# Patient Record
Sex: FEMALE | Race: WHITE | HISPANIC OR LATINO | RURAL
[De-identification: names, ages, dates, MRNs, and addresses within clinical notes are randomized per-mention and may not be internally consistent; named-entity substitution may affect disease eponyms.]

---

## 2020-09-24 ENCOUNTER — APPOINTMENT (OUTPATIENT)
Age: 40
Setting detail: DERMATOLOGY
End: 2020-09-25

## 2020-09-24 VITALS — TEMPERATURE: 97.6 F

## 2020-09-24 DIAGNOSIS — Z41.9 ENCOUNTER FOR PROCEDURE FOR PURPOSES OTHER THAN REMEDYING HEALTH STATE, UNSPECIFIED: ICD-10-CM

## 2020-09-24 PROCEDURE — OTHER BOTOX: OTHER

## 2020-09-24 PROCEDURE — OTHER MIPS QUALITY: OTHER

## 2020-09-24 PROCEDURE — OTHER FILLERS: OTHER

## 2020-09-24 NOTE — PROCEDURE: MIPS QUALITY
Quality 111:Pneumonia Vaccination Status For Older Adults: Pneumococcal Vaccination Previously Received
Quality 110: Preventive Care And Screening: Influenza Immunization: Influenza Immunization not Administered because Patient Refused.
Detail Level: Detailed
Quality 402: Tobacco Use And Help With Quitting Among Adolescents: Patient screened for tobacco and never smoked
Quality 226: Preventive Care And Screening: Tobacco Use: Screening And Cessation Intervention: Patient screened for tobacco use and is an ex/non-smoker

## 2020-09-24 NOTE — PROCEDURE: BOTOX
Lateral Platysmal Bands Units: 0
Show Mentalis Units: No
Show Topical Anesthesia: Yes
Periorbital Skin Units: 3
Forehead Units: 16
Detail Level: Detailed
Expiration Date (Month Year): 12/2020
Glabellar Complex Units: 12
Price (Use Numbers Only, No Special Characters Or $): 421
Dilution (U/0.1 Cc): 2.6
Consent: Written consent obtained. Risks include but not limited to lid/brow ptosis, bruising, swelling, diplopia, temporary effect, incomplete chemical denervation.
Lot #: [id number]
Nasal Root Units: 4
Post-Care Instructions: Patient instructed to not lie down for 4 hours and limit physical activity for 24 hours. Patient instructed not to travel by airplane for 48 hours.

## 2020-09-24 NOTE — PROCEDURE: FILLERS
Additional Area 1 Volume In Cc: 0
Consent: Written consent obtained. Risks include but not limited to bruising, beading, irregular texture, ulceration, infection, allergic reaction, scar formation, incomplete augmentation, temporary nature, procedural pain.
Lot #: [id number]
Include Cannula Information In Note?: No
Post-Care Instructions: Patient instructed to apply ice to reduce swelling.
Map Statment: See Attach Map for Complete Details
Price (Use Numbers Only, No Special Characters Or $): 191
Expiration Date (Month Year): 1/13/2022
Nasolabial Folds Filler Volume In Cc: 0.4
Filler: Versa
Additional Anesthesia Volume In Cc: 6
Detail Level: Detailed

## 2020-10-01 ENCOUNTER — APPOINTMENT (OUTPATIENT)
Age: 40
Setting detail: DERMATOLOGY
End: 2020-10-02

## 2020-10-01 VITALS — TEMPERATURE: 97.9 F

## 2020-10-01 DIAGNOSIS — Z41.9 ENCOUNTER FOR PROCEDURE FOR PURPOSES OTHER THAN REMEDYING HEALTH STATE, UNSPECIFIED: ICD-10-CM

## 2020-10-01 PROCEDURE — OTHER KYBELLA INJECTION: OTHER

## 2020-10-01 NOTE — PROCEDURE: KYBELLA INJECTION
Price (Use Numbers Only, No Special Characters Or $): 1200
Packages Used (Won't Render If 0 - Required If Using Inventory): 2
Number Of Grid Dots (Won't Render If 0): 50
Post-Care Instructions: Patient instructed to apply ice to reduce swelling.
Treatment Area: Submental Chin
Reconstitution Date: 10/1/2020
Lot #: [id number]
Detail Level: Detailed
Procedure Text: The treatment areas were cleansed. Kybella was administered using the parameters mentioned above.
Consent: Written consent obtained. Risks include but not limited to bruising, beading, irregular texture, ulceration, infection, allergic reaction, scar formation, incomplete augmentation, temporary nature, procedural pain.
Treatment Number (Won't Render If 0): 1
Anesthesia Volume In Cc: 0.5
Expiration Date (Month Year): sept 2021

## 2020-11-12 ENCOUNTER — APPOINTMENT (OUTPATIENT)
Age: 40
Setting detail: DERMATOLOGY
End: 2020-11-13

## 2020-11-12 ENCOUNTER — APPOINTMENT (OUTPATIENT)
Age: 40
Setting detail: DERMATOLOGY
End: 2020-11-12

## 2020-11-12 VITALS — TEMPERATURE: 98.1 F

## 2020-11-12 DIAGNOSIS — Z41.9 ENCOUNTER FOR PROCEDURE FOR PURPOSES OTHER THAN REMEDYING HEALTH STATE, UNSPECIFIED: ICD-10-CM

## 2020-11-12 PROCEDURE — OTHER KYBELLA INJECTION: OTHER

## 2020-11-12 NOTE — PROCEDURE: KYBELLA INJECTION
Expiration Date (Month Year): sept 2021
Number Of Grid Dots (Won't Render If 0): 15
Detail Level: Detailed
Anesthesia Volume In Cc: 0.5
Treatment Number (Won't Render If 0): 1
Post-Care Instructions: Patient instructed to apply ice to reduce swelling.
Reconstitution Date: 10/1/2020
Consent: Written consent obtained. Risks include but not limited to bruising, beading, irregular texture, ulceration, infection, allergic reaction, scar formation, incomplete augmentation, temporary nature, procedural pain.
Price (Use Numbers Only, No Special Characters Or $): 800.0
Kybella Volume In Cc (Won't Render If 0): 2
Treatment Area: Submental Chin
Procedure Text: The treatment areas were cleansed. Kybella was administered using the parameters mentioned above.
Lot #: [id number]

## 2020-11-17 ENCOUNTER — APPOINTMENT (OUTPATIENT)
Age: 40
Setting detail: DERMATOLOGY
End: 2020-11-18

## 2020-11-17 VITALS — TEMPERATURE: 97.6 F

## 2020-11-17 DIAGNOSIS — Z41.9 ENCOUNTER FOR PROCEDURE FOR PURPOSES OTHER THAN REMEDYING HEALTH STATE, UNSPECIFIED: ICD-10-CM

## 2020-11-17 PROCEDURE — OTHER BOTOX: OTHER

## 2020-11-17 PROCEDURE — OTHER MIPS QUALITY: OTHER

## 2020-11-17 NOTE — PROCEDURE: BOTOX
Show Ucl Units: No
Depressor Anguli Oris Units: 0
Show Additional Area 1: Yes
Detail Level: Detailed
Periorbital Skin Units: 4
Consent: Written consent obtained. Risks include but not limited to lid/brow ptosis, bruising, swelling, diplopia, temporary effect, incomplete chemical denervation.
Reconstitution Date (Optional): 11/17/20
Expiration Date (Month Year): 08/2023
Lot #: [id number]
Dilution (U/0.1 Cc): 2.6
Post-Care Instructions: Patient instructed to not lie down for 4 hours and limit physical activity for 24 hours. Patient instructed not to travel by airplane for 48 hours.

## 2020-11-17 NOTE — PROCEDURE: MIPS QUALITY
Quality 110: Preventive Care And Screening: Influenza Immunization: Influenza Immunization not Administered because Patient Refused.
Quality 111:Pneumonia Vaccination Status For Older Adults: Pneumococcal Vaccination Previously Received
Detail Level: Detailed
Quality 226: Preventive Care And Screening: Tobacco Use: Screening And Cessation Intervention: Patient screened for tobacco use and is an ex/non-smoker
Quality 402: Tobacco Use And Help With Quitting Among Adolescents: Patient screened for tobacco and never smoked

## 2021-03-24 ENCOUNTER — APPOINTMENT (OUTPATIENT)
Age: 41
Setting detail: DERMATOLOGY
End: 2021-03-25

## 2021-03-24 VITALS — TEMPERATURE: 97.8 F

## 2021-03-24 DIAGNOSIS — Z41.9 ENCOUNTER FOR PROCEDURE FOR PURPOSES OTHER THAN REMEDYING HEALTH STATE, UNSPECIFIED: ICD-10-CM

## 2021-03-24 PROCEDURE — OTHER MIPS QUALITY: OTHER

## 2021-03-24 PROCEDURE — OTHER BOTOX: OTHER

## 2021-03-24 PROCEDURE — OTHER KYBELLA INJECTION: OTHER

## 2021-03-24 NOTE — PROCEDURE: MIPS QUALITY
Quality 110: Preventive Care And Screening: Influenza Immunization: Influenza Immunization not Administered because Patient Refused.
Quality 111:Pneumonia Vaccination Status For Older Adults: Pneumococcal Vaccination Previously Received
Quality 402: Tobacco Use And Help With Quitting Among Adolescents: Patient screened for tobacco and never smoked
Quality 226: Preventive Care And Screening: Tobacco Use: Screening And Cessation Intervention: Patient screened for tobacco use and is an ex/non-smoker
Detail Level: Detailed

## 2021-03-24 NOTE — PROCEDURE: BOTOX
Show Additional Area 2: Yes
Show Right And Left Periorbital Units: No
Inferior Lateral Orbicularis Oculi Units: 0
Expiration Date (Month Year): 08/2023
Post-Care Instructions: Patient instructed to not lie down for 4 hours and limit physical activity for 24 hours. Patient instructed not to travel by airplane for 48 hours.
Lot #: [id number]
Reconstitution Date (Optional): 11/17/20
Show Inventory Tab: Show
Dilution (U/0.1 Cc): 2.6
Forehead Units: 12
Detail Level: Detailed
Glabellar Complex Units: 8
Consent: Written consent obtained. Risks include but not limited to lid/brow ptosis, bruising, swelling, diplopia, temporary effect, incomplete chemical denervation.

## 2021-03-24 NOTE — PROCEDURE: KYBELLA INJECTION
Anesthesia Volume In Cc: 0.5
Detail Level: Detailed
Kybella Volume In Cc (Won't Render If 0): 2
Treatment Area: Submental Chin
Expiration Date (Month Year): sept 2021
Reconstitution Date: 10/1/2020
Price (Use Numbers Only, No Special Characters Or $): 1100
Post-Care Instructions: Patient instructed to apply ice to reduce swelling.
Consent: Written consent obtained. Risks include but not limited to bruising, beading, irregular texture, ulceration, infection, allergic reaction, scar formation, incomplete augmentation, temporary nature, procedural pain.
Procedure Text: The treatment areas were cleansed. Kybella was administered using the parameters mentioned above.
Lot #: [id number]
Treatment Number (Won't Render If 0): 3
Number Of Grid Dots (Won't Render If 0): 15

## 2021-07-06 ENCOUNTER — APPOINTMENT (OUTPATIENT)
Age: 41
Setting detail: DERMATOLOGY
End: 2021-07-07

## 2021-07-06 VITALS — TEMPERATURE: 98 F

## 2021-07-06 DIAGNOSIS — Z41.9 ENCOUNTER FOR PROCEDURE FOR PURPOSES OTHER THAN REMEDYING HEALTH STATE, UNSPECIFIED: ICD-10-CM

## 2021-07-06 PROCEDURE — OTHER BOTOX: OTHER

## 2021-07-06 PROCEDURE — OTHER MIPS QUALITY: OTHER

## 2021-07-06 PROCEDURE — OTHER KYBELLA INJECTION: OTHER

## 2021-07-06 NOTE — PROCEDURE: BOTOX
Show Inventory Tab: Show
Show Right And Left Brow Units: No
Additional Area 3 Units: 0
Lot #: [id number]
Show Glabellar Units: Yes
Post-Care Instructions: Patient instructed to not lie down for 4 hours and limit physical activity for 24 hours. Patient instructed not to travel by airplane for 48 hours.
Dilution (U/0.1 Cc): 2.6
Nasal Root Units: 2
Glabellar Complex Units: 10
Consent: Written consent obtained. Risks include but not limited to lid/brow ptosis, bruising, swelling, diplopia, temporary effect, incomplete chemical denervation.
Detail Level: Detailed
Price (Use Numbers Only, No Special Characters Or $): 220.00
Expiration Date (Month Year): 12/2020

## 2021-07-06 NOTE — PROCEDURE: MIPS QUALITY
Quality 226: Preventive Care And Screening: Tobacco Use: Screening And Cessation Intervention: Patient screened for tobacco use and is an ex/non-smoker
Quality 402: Tobacco Use And Help With Quitting Among Adolescents: Patient screened for tobacco and never smoked
Quality 111:Pneumonia Vaccination Status For Older Adults: Pneumococcal Vaccination Previously Received
Detail Level: Detailed
Quality 110: Preventive Care And Screening: Influenza Immunization: Influenza Immunization not Administered because Patient Refused.

## 2021-07-06 NOTE — PROCEDURE: KYBELLA INJECTION
Number Of Grid Dots (Won't Render If 0): 15
Post-Care Instructions: Patient instructed to apply ice to reduce swelling.
Treatment Area: Submental Chin
Kybella Volume In Cc (Won't Render If 0): 2.3
Lot #: [id number]
Expiration Date (Month Year): sept 2021
Packages Used (Won't Render If 0 - Required If Using Inventory): 1
Consent: Written consent obtained. Risks include but not limited to bruising, beading, irregular texture, ulceration, infection, allergic reaction, scar formation, incomplete augmentation, temporary nature, procedural pain.
Reconstitution Date: 10/1/2020
Detail Level: Detailed
Procedure Text: The treatment areas were cleansed. Kybella was administered using the parameters mentioned above.
Treatment Number (Won't Render If 0): 4
Price (Use Numbers Only, No Special Characters Or $): 327
Anesthesia Volume In Cc: 0.5

## 2021-07-19 ENCOUNTER — APPOINTMENT (OUTPATIENT)
Age: 41
Setting detail: DERMATOLOGY
End: 2021-07-20

## 2021-07-19 VITALS — TEMPERATURE: 97.8 F

## 2021-07-19 DIAGNOSIS — L23.9 ALLERGIC CONTACT DERMATITIS, UNSPECIFIED CAUSE: ICD-10-CM

## 2021-07-19 DIAGNOSIS — Z41.9 ENCOUNTER FOR PROCEDURE FOR PURPOSES OTHER THAN REMEDYING HEALTH STATE, UNSPECIFIED: ICD-10-CM

## 2021-07-19 PROCEDURE — OTHER TREATMENT REGIMEN: OTHER

## 2021-07-19 PROCEDURE — OTHER PRESCRIPTION: OTHER

## 2021-07-19 PROCEDURE — OTHER COUNSELING: OTHER

## 2021-07-19 PROCEDURE — OTHER MIPS QUALITY: OTHER

## 2021-07-19 PROCEDURE — OTHER BOTOX: OTHER

## 2021-07-19 RX ORDER — TRIAMCINOLONE ACETONIDE 1 MG/G
CREAM TOPICAL QD
Qty: 1 | Refills: 0 | Status: ERX | COMMUNITY
Start: 2021-07-19

## 2021-07-19 ASSESSMENT — LOCATION DETAILED DESCRIPTION DERM
LOCATION DETAILED: LEFT LATERAL EYEBROW
LOCATION DETAILED: LEFT MEDIAL CANTHUS
LOCATION DETAILED: RIGHT LATERAL EYEBROW

## 2021-07-19 ASSESSMENT — LOCATION SIMPLE DESCRIPTION DERM
LOCATION SIMPLE: RIGHT EYEBROW
LOCATION SIMPLE: LEFT EYEBROW
LOCATION SIMPLE: LEFT EYELID

## 2021-07-19 ASSESSMENT — LOCATION ZONE DERM
LOCATION ZONE: FACE
LOCATION ZONE: EYELID

## 2021-07-19 NOTE — PROCEDURE: MIPS QUALITY
Detail Level: Detailed
Quality 111:Pneumonia Vaccination Status For Older Adults: Pneumococcal Vaccination Previously Received
Quality 110: Preventive Care And Screening: Influenza Immunization: Influenza Immunization not Administered because Patient Refused.
Quality 226: Preventive Care And Screening: Tobacco Use: Screening And Cessation Intervention: Patient screened for tobacco use and is an ex/non-smoker
Quality 402: Tobacco Use And Help With Quitting Among Adolescents: Patient screened for tobacco and never smoked

## 2021-10-29 ENCOUNTER — APPOINTMENT (OUTPATIENT)
Age: 41
Setting detail: DERMATOLOGY
End: 2021-11-01

## 2021-10-29 VITALS — TEMPERATURE: 97.8 F

## 2021-10-29 DIAGNOSIS — Z41.9 ENCOUNTER FOR PROCEDURE FOR PURPOSES OTHER THAN REMEDYING HEALTH STATE, UNSPECIFIED: ICD-10-CM

## 2021-10-29 PROCEDURE — OTHER BOTOX: OTHER

## 2021-10-29 PROCEDURE — OTHER MIPS QUALITY: OTHER

## 2021-10-29 NOTE — PROCEDURE: BOTOX
Show Mentalis Units: No
Nasal Root Units: 4
Post-Care Instructions: Patient instructed to not lie down for 4 hours and limit physical activity for 24 hours. Patient instructed not to travel by airplane for 48 hours.
Forehead Units: 10
Left Periorbital Skin Units: 0
Show Inventory Tab: Show
Expiration Date (Month Year): 12/2020
Show Depressor Anguli Units: Yes
Dilution (U/0.1 Cc): 2.6
Periorbital Skin Units: 8
Detail Level: Detailed
Price (Use Numbers Only, No Special Characters Or $): 220.00
Consent: Written consent obtained. Risks include but not limited to lid/brow ptosis, bruising, swelling, diplopia, temporary effect, incomplete chemical denervation.
Lot #: [id number]

## 2021-11-01 ENCOUNTER — APPOINTMENT (OUTPATIENT)
Age: 41
Setting detail: DERMATOLOGY
End: 2021-11-01

## 2021-11-01 ENCOUNTER — APPOINTMENT (OUTPATIENT)
Age: 41
Setting detail: DERMATOLOGY
End: 2021-11-02

## 2021-11-01 VITALS — TEMPERATURE: 97.8 F

## 2021-11-01 DIAGNOSIS — L98.8 OTHER SPECIFIED DISORDERS OF THE SKIN AND SUBCUTANEOUS TISSUE: ICD-10-CM

## 2021-11-01 PROCEDURE — OTHER MIPS QUALITY: OTHER

## 2021-11-01 PROCEDURE — OTHER VERSA PLUS INJECTION: OTHER

## 2021-11-01 PROCEDURE — OTHER OTHER: OTHER

## 2021-11-01 ASSESSMENT — LOCATION DETAILED DESCRIPTION DERM
LOCATION DETAILED: LEFT SUPERIOR MEDIAL BUCCAL CHEEK
LOCATION DETAILED: RIGHT SUPERIOR MEDIAL BUCCAL CHEEK

## 2021-11-01 ASSESSMENT — LOCATION SIMPLE DESCRIPTION DERM
LOCATION SIMPLE: RIGHT CHEEK
LOCATION SIMPLE: LEFT CHEEK

## 2021-11-01 ASSESSMENT — LOCATION ZONE DERM: LOCATION ZONE: FACE

## 2021-11-01 NOTE — PROCEDURE: VERSA PLUS INJECTION
Jawline Filler Volume In Cc: 0
Nasolabial Folds Filler Volume In Cc: 1
Include Cannula Information In Note?: No
Map Statment: See Attach Map for Complete Details
Show Inventory Tab: Show
Post-Care Instructions: Patient instructed to apply ice to reduce swelling.
Anesthesia Volume In Cc: 0.5
Anesthesia Type: 1% lidocaine with epinephrine
Consent: Written consent obtained. Risks include but not limited to bruising, beading, irregular texture, ulceration, infection, allergic reaction, scar formation, incomplete augmentation, temporary nature, procedural pain.
Procedural Text: The filler was administered to the treatment areas noted above.
Additional Anesthesia Volume In Cc: 6
Detail Level: Detailed
Filler: Versa

## 2021-11-01 NOTE — PROCEDURE: OTHER
Other (Free Text): 6 corrective units added
Detail Level: Zone
Render Risk Assessment In Note?: no
Note Text (......Xxx Chief Complaint.): This diagnosis correlates with the

## 2021-11-01 NOTE — PROCEDURE: MIPS QUALITY
Quality 226: Preventive Care And Screening: Tobacco Use: Screening And Cessation Intervention: Patient screened for tobacco use and is an ex/non-smoker
Quality 402: Tobacco Use And Help With Quitting Among Adolescents: Patient screened for tobacco and never smoked
Detail Level: Detailed
Quality 110: Preventive Care And Screening: Influenza Immunization: Influenza Immunization not Administered because Patient Refused.
Quality 111:Pneumonia Vaccination Status For Older Adults: Pneumococcal Vaccination Previously Received

## 2022-05-18 NOTE — PROCEDURE: BOTOX
Lcl Root Units: 0
Show Mentalis Units: No
Show Periorbital Units: Yes
Detail Level: Detailed
Price (Use Numbers Only, No Special Characters Or $): 220.00
Periorbital Skin Units: 2
Expiration Date (Month Year): 12/2020
Consent: Written consent obtained. Risks include but not limited to lid/brow ptosis, bruising, swelling, diplopia, temporary effect, incomplete chemical denervation.
Lot #: [id number]
Dilution (U/0.1 Cc): 2.6
Show Inventory Tab: Show
Nasal Root Units: 4
Post-Care Instructions: Patient instructed to not lie down for 4 hours and limit physical activity for 24 hours. Patient instructed not to travel by airplane for 48 hours.
Clothing

## 2022-08-23 ENCOUNTER — APPOINTMENT (OUTPATIENT)
Age: 42
Setting detail: DERMATOLOGY
End: 2022-08-23

## 2022-08-23 ENCOUNTER — APPOINTMENT (OUTPATIENT)
Age: 42
Setting detail: DERMATOLOGY
End: 2022-08-24

## 2022-08-23 VITALS — TEMPERATURE: 98 F

## 2022-08-23 DIAGNOSIS — Z41.9 ENCOUNTER FOR PROCEDURE FOR PURPOSES OTHER THAN REMEDYING HEALTH STATE, UNSPECIFIED: ICD-10-CM

## 2022-08-23 PROCEDURE — OTHER BOTOX: OTHER

## 2022-08-23 PROCEDURE — OTHER JUVEDERM ULTRA PLUS XC INJECTION: OTHER

## 2022-08-23 PROCEDURE — OTHER JUVEDERM VOLBELLA INJECTION: OTHER

## 2022-08-23 PROCEDURE — OTHER MIPS QUALITY: OTHER

## 2022-08-23 ASSESSMENT — LOCATION DETAILED DESCRIPTION DERM
LOCATION DETAILED: RIGHT INFERIOR VERMILION LIP
LOCATION DETAILED: RIGHT SUPERIOR VERMILION LIP
LOCATION DETAILED: RIGHT INFERIOR MEDIAL MALAR CHEEK
LOCATION DETAILED: LEFT INFERIOR MEDIAL MALAR CHEEK
LOCATION DETAILED: LEFT INFERIOR VERMILION LIP

## 2022-08-23 ASSESSMENT — LOCATION SIMPLE DESCRIPTION DERM
LOCATION SIMPLE: RIGHT LIP
LOCATION SIMPLE: RIGHT CHEEK
LOCATION SIMPLE: LEFT CHEEK
LOCATION SIMPLE: LEFT LIP

## 2022-08-23 ASSESSMENT — LOCATION ZONE DERM
LOCATION ZONE: LIP
LOCATION ZONE: FACE

## 2022-08-23 NOTE — PROCEDURE: MIPS QUALITY
Quality 226: Preventive Care And Screening: Tobacco Use: Screening And Cessation Intervention: Patient screened for tobacco use and is an ex/non-smoker
Quality 110: Preventive Care And Screening: Influenza Immunization: Influenza Immunization not Administered because Patient Refused.
Quality 111:Pneumonia Vaccination Status For Older Adults: Pneumococcal Vaccination Previously Received
Detail Level: Detailed
Quality 402: Tobacco Use And Help With Quitting Among Adolescents: Patient screened for tobacco and never smoked

## 2022-08-23 NOTE — PROCEDURE: BOTOX
Show Ucl Units: No
Levator Labii Superioris Units: 0
Show Nasal Units: Yes
Dilution (U/0.1 Cc): 2.6
Post-Care Instructions: Patient instructed to not lie down for 4 hours and limit physical activity for 24 hours. Patient instructed not to travel by airplane for 48 hours.
Nasal Root Units: 6
Expiration Date (Month Year): 12/2020
Forehead Units: 10
Consent: Written consent obtained. Risks include but not limited to lid/brow ptosis, bruising, swelling, diplopia, temporary effect, incomplete chemical denervation.
Detail Level: Detailed
Price (Use Numbers Only, No Special Characters Or $): 220.00
Periorbital Skin Units: 8
Show Inventory Tab: Show
Lot #: [id number]

## 2022-08-23 NOTE — PROCEDURE: JUVEDERM ULTRA PLUS XC INJECTION
Vermilion Lips Filler Volume In Cc: 0
Filler: Juvederm Ultra Plus XC
Anesthesia Type: 1% lidocaine with epinephrine
Anesthesia Volume In Cc: 0.5
Map Statment: See Attach Map for Complete Details
Nasolabial Folds Filler Volume In Cc: 1
Use Map Statement For Sites (Optional): No
Additional Anesthesia Volume In Cc: 6
Procedural Text: The filler was administered to the treatment areas noted above.
Post-Care Instructions: Patient instructed to apply ice to reduce swelling.
Detail Level: Detailed
Consent: Written consent obtained. Risks include but not limited to bruising, beading, irregular texture, ulceration, infection, allergic reaction, scar formation, incomplete augmentation, temporary nature, procedural pain.
Show Inventory Tab: Show

## 2022-08-23 NOTE — PROCEDURE: JUVEDERM VOLBELLA INJECTION
Cheeks Filler Volume In Cc: 0
Include Cannula Information In Note?: No
Procedural Text: The filler was administered to the treatment areas noted above.
Number Of Syringes (Required For Inventory): 1
Additional Anesthesia Volume In Cc: 6
Filler: Juvederm Volbella XC
Anesthesia Type: 1% lidocaine with epinephrine
Anesthesia Volume In Cc: 0.5
Show Inventory Tab: Show
Detail Level: Detailed
Post-Care Instructions: Patient instructed to apply ice to reduce swelling.
Consent: Written consent obtained. Risks include but not limited to bruising, beading, irregular texture, ulceration, infection, allergic reaction, scar formation, incomplete augmentation, temporary nature, procedural pain.
Map Statment: See Attach Map for Complete Details

## 2022-09-13 ENCOUNTER — APPOINTMENT (OUTPATIENT)
Age: 42
Setting detail: DERMATOLOGY
End: 2022-09-14

## 2022-09-13 DIAGNOSIS — L98.8 OTHER SPECIFIED DISORDERS OF THE SKIN AND SUBCUTANEOUS TISSUE: ICD-10-CM

## 2022-09-13 PROCEDURE — OTHER MIPS QUALITY: OTHER

## 2022-09-13 PROCEDURE — OTHER BOTOX: OTHER

## 2022-09-13 ASSESSMENT — LOCATION DETAILED DESCRIPTION DERM
LOCATION DETAILED: RIGHT NASAL SIDEWALL
LOCATION DETAILED: RIGHT SUPERIOR LATERAL MALAR CHEEK
LOCATION DETAILED: RIGHT INFERIOR LATERAL FOREHEAD
LOCATION DETAILED: NASAL DORSUM
LOCATION DETAILED: GLABELLA
LOCATION DETAILED: LEFT SUPERIOR LATERAL MALAR CHEEK
LOCATION DETAILED: LEFT INFERIOR LATERAL FOREHEAD

## 2022-09-13 ASSESSMENT — LOCATION SIMPLE DESCRIPTION DERM
LOCATION SIMPLE: RIGHT FOREHEAD
LOCATION SIMPLE: LEFT FOREHEAD
LOCATION SIMPLE: LEFT CHEEK
LOCATION SIMPLE: RIGHT CHEEK
LOCATION SIMPLE: RIGHT NOSE
LOCATION SIMPLE: GLABELLA
LOCATION SIMPLE: NOSE

## 2022-09-13 ASSESSMENT — LOCATION ZONE DERM
LOCATION ZONE: NOSE
LOCATION ZONE: FACE

## 2022-09-13 NOTE — PROCEDURE: BOTOX
Show Anterior Platysmal Band Units: Yes
Show Lcl Units: No
Inferior Lateral Orbicularis Oculi Units: 0
Lot #: [id number]
Show Inventory Tab: Show
Post-Care Instructions: Patient instructed to not lie down for 4 hours and limit physical activity for 24 hours. Patient instructed not to travel by airplane for 48 hours.
Dilution (U/0.1 Cc): 2.6
Expiration Date (Month Year): 12/2020
Consent: Written consent obtained. Risks include but not limited to lid/brow ptosis, bruising, swelling, diplopia, temporary effect, incomplete chemical denervation.
Nasal Root Units: 4
Detail Level: Detailed
Price (Use Numbers Only, No Special Characters Or $): 220.00
Periorbital Skin Units: 10

## 2022-09-13 NOTE — PROCEDURE: MIPS QUALITY
Quality 226: Preventive Care And Screening: Tobacco Use: Screening And Cessation Intervention: Patient screened for tobacco use and is an ex/non-smoker
Quality 111:Pneumonia Vaccination Status For Older Adults: Pneumococcal Vaccination Previously Received
Quality 402: Tobacco Use And Help With Quitting Among Adolescents: Patient screened for tobacco and never smoked
Quality 110: Preventive Care And Screening: Influenza Immunization: Influenza Immunization not Administered because Patient Refused.
Detail Level: Detailed
complains of pain/discomfort

## 2022-11-03 ENCOUNTER — APPOINTMENT (OUTPATIENT)
Age: 42
Setting detail: DERMATOLOGY
End: 2022-11-04

## 2022-11-03 VITALS — TEMPERATURE: 98 F

## 2022-11-03 DIAGNOSIS — L98.8 OTHER SPECIFIED DISORDERS OF THE SKIN AND SUBCUTANEOUS TISSUE: ICD-10-CM

## 2022-11-03 PROCEDURE — OTHER MIPS QUALITY: OTHER

## 2022-11-03 PROCEDURE — OTHER BOTOX: OTHER

## 2022-11-03 ASSESSMENT — LOCATION DETAILED DESCRIPTION DERM
LOCATION DETAILED: NASAL DORSUM
LOCATION DETAILED: GLABELLA
LOCATION DETAILED: RIGHT SUPERIOR LATERAL MALAR CHEEK
LOCATION DETAILED: RIGHT INFERIOR LATERAL FOREHEAD
LOCATION DETAILED: LEFT SUPERIOR LATERAL MALAR CHEEK
LOCATION DETAILED: RIGHT NASAL SIDEWALL
LOCATION DETAILED: LEFT INFERIOR LATERAL FOREHEAD

## 2022-11-03 ASSESSMENT — LOCATION SIMPLE DESCRIPTION DERM
LOCATION SIMPLE: NOSE
LOCATION SIMPLE: LEFT FOREHEAD
LOCATION SIMPLE: RIGHT NOSE
LOCATION SIMPLE: GLABELLA
LOCATION SIMPLE: RIGHT FOREHEAD
LOCATION SIMPLE: LEFT CHEEK
LOCATION SIMPLE: RIGHT CHEEK

## 2022-11-03 ASSESSMENT — LOCATION ZONE DERM
LOCATION ZONE: FACE
LOCATION ZONE: NOSE

## 2022-11-03 NOTE — PROCEDURE: BOTOX
Show Additional Area 1: Yes
Dilution (U/0.1 Cc): 2.6
[facility] Units: 0
Nasal Root Units: 4
Post-Care Instructions: Patient instructed to not lie down for 4 hours and limit physical activity for 24 hours. Patient instructed not to travel by airplane for 48 hours.
Price (Use Numbers Only, No Special Characters Or $): 220.00
Expiration Date (Month Year): 12/2020
Lot #: [id number]
Show Mentalis Units: No
Forehead Units: 10
Detail Level: Detailed
Consent: Written consent obtained. Risks include but not limited to lid/brow ptosis, bruising, swelling, diplopia, temporary effect, incomplete chemical denervation.
Show Inventory Tab: Show

## 2022-12-15 ENCOUNTER — APPOINTMENT (OUTPATIENT)
Age: 42
Setting detail: DERMATOLOGY
End: 2022-12-16

## 2022-12-15 VITALS — TEMPERATURE: 97.8 F

## 2022-12-15 DIAGNOSIS — Z41.9 ENCOUNTER FOR PROCEDURE FOR PURPOSES OTHER THAN REMEDYING HEALTH STATE, UNSPECIFIED: ICD-10-CM

## 2022-12-15 PROCEDURE — OTHER OTHER (COSMETIC): OTHER

## 2022-12-15 PROCEDURE — OTHER MIPS QUALITY: OTHER

## 2022-12-15 ASSESSMENT — LOCATION SIMPLE DESCRIPTION DERM
LOCATION SIMPLE: LEFT FOREHEAD
LOCATION SIMPLE: RIGHT FOREHEAD
LOCATION SIMPLE: GLABELLA

## 2022-12-15 ASSESSMENT — LOCATION DETAILED DESCRIPTION DERM
LOCATION DETAILED: RIGHT SUPERIOR MEDIAL FOREHEAD
LOCATION DETAILED: LEFT FOREHEAD
LOCATION DETAILED: GLABELLA
LOCATION DETAILED: RIGHT LATERAL FOREHEAD

## 2022-12-15 ASSESSMENT — LOCATION ZONE DERM: LOCATION ZONE: FACE

## 2022-12-15 NOTE — PROCEDURE: MIPS QUALITY
Quality 402: Tobacco Use And Help With Quitting Among Adolescents: Patient screened for tobacco and never smoked
Quality 110: Preventive Care And Screening: Influenza Immunization: Influenza Immunization not Administered because Patient Refused.
Quality 226: Preventive Care And Screening: Tobacco Use: Screening And Cessation Intervention: Patient screened for tobacco use and is an ex/non-smoker
Detail Level: Detailed
Quality 111:Pneumonia Vaccination Status For Older Adults: Pneumococcal Vaccination Previously Received

## 2023-02-06 ENCOUNTER — APPOINTMENT (OUTPATIENT)
Age: 43
Setting detail: DERMATOLOGY
End: 2023-02-07

## 2023-02-06 ENCOUNTER — APPOINTMENT (OUTPATIENT)
Age: 43
Setting detail: DERMATOLOGY
End: 2023-02-06

## 2023-02-06 DIAGNOSIS — Z41.9 ENCOUNTER FOR PROCEDURE FOR PURPOSES OTHER THAN REMEDYING HEALTH STATE, UNSPECIFIED: ICD-10-CM

## 2023-02-06 PROCEDURE — OTHER MIPS QUALITY: OTHER

## 2023-02-06 PROCEDURE — OTHER BOTOX: OTHER

## 2023-02-06 ASSESSMENT — LOCATION SIMPLE DESCRIPTION DERM
LOCATION SIMPLE: RIGHT TEMPLE
LOCATION SIMPLE: GLABELLA
LOCATION SIMPLE: LEFT CHEEK
LOCATION SIMPLE: LEFT TEMPLE
LOCATION SIMPLE: RIGHT NOSE
LOCATION SIMPLE: RIGHT FOREHEAD
LOCATION SIMPLE: LEFT FOREHEAD
LOCATION SIMPLE: RIGHT CHEEK

## 2023-02-06 ASSESSMENT — LOCATION ZONE DERM
LOCATION ZONE: NOSE
LOCATION ZONE: FACE

## 2023-02-06 ASSESSMENT — LOCATION DETAILED DESCRIPTION DERM
LOCATION DETAILED: LEFT SUPERIOR NASAL CHEEK
LOCATION DETAILED: GLABELLA
LOCATION DETAILED: LEFT INFERIOR TEMPLE
LOCATION DETAILED: RIGHT MID TEMPLE
LOCATION DETAILED: RIGHT CENTRAL MALAR CHEEK
LOCATION DETAILED: LEFT INFERIOR LATERAL FOREHEAD
LOCATION DETAILED: RIGHT NASAL SIDEWALL
LOCATION DETAILED: RIGHT INFERIOR FOREHEAD

## 2023-02-06 NOTE — PROCEDURE: BOTOX
Anterior Platysmal Bands Units: 0
Show Additional Area 4: Yes
Post-Care Instructions: Patient instructed to not lie down for 4 hours and limit physical activity for 24 hours. Patient instructed not to travel by airplane for 48 hours.
Show Mentalis Units: No
Consent: Written consent obtained. Risks include but not limited to lid/brow ptosis, bruising, swelling, diplopia, temporary effect, incomplete chemical denervation.
Forehead Units: 4
Glabellar Complex Units: 2
Lot #: [id number]
Detail Level: Detailed
Price (Use Numbers Only, No Special Characters Or $): 220.00
Show Inventory Tab: Show
Dilution (U/0.1 Cc): 2.6
Incrementing Botox Units: By 0.5 Units
Inferior Lateral Orbicularis Oculi Units: 10
Expiration Date (Month Year): 12/2020

## 2023-02-06 NOTE — PROCEDURE: MIPS QUALITY
Quality 110: Preventive Care And Screening: Influenza Immunization: Influenza Immunization not Administered because Patient Refused.
Quality 402: Tobacco Use And Help With Quitting Among Adolescents: Patient screened for tobacco and never smoked
Detail Level: Detailed
Quality 226: Preventive Care And Screening: Tobacco Use: Screening And Cessation Intervention: Patient screened for tobacco use and is an ex/non-smoker
Quality 111:Pneumonia Vaccination Status For Older Adults: Pneumococcal Vaccination Previously Received

## 2024-06-17 ENCOUNTER — APPOINTMENT (OUTPATIENT)
Dept: URBAN - METROPOLITAN AREA CLINIC 227 | Age: 44
Setting detail: DERMATOLOGY
End: 2024-06-17

## 2024-06-20 ENCOUNTER — APPOINTMENT (OUTPATIENT)
Age: 44
Setting detail: DERMATOLOGY
End: 2024-06-21

## 2024-06-20 DIAGNOSIS — Z41.9 ENCOUNTER FOR PROCEDURE FOR PURPOSES OTHER THAN REMEDYING HEALTH STATE, UNSPECIFIED: ICD-10-CM

## 2024-06-20 PROCEDURE — OTHER MIPS QUALITY: OTHER

## 2024-06-20 PROCEDURE — OTHER INVENTORY: OTHER

## 2024-06-20 PROCEDURE — OTHER BOTOX: OTHER

## 2024-06-20 ASSESSMENT — LOCATION ZONE DERM
LOCATION ZONE: LIP
LOCATION ZONE: EYELID
LOCATION ZONE: FACE

## 2024-06-20 ASSESSMENT — LOCATION DETAILED DESCRIPTION DERM
LOCATION DETAILED: LEFT INFERIOR MEDIAL MALAR CHEEK
LOCATION DETAILED: RIGHT INFERIOR MEDIAL MALAR CHEEK
LOCATION DETAILED: RIGHT MEDIAL FOREHEAD
LOCATION DETAILED: GLABELLA
LOCATION DETAILED: LEFT SUPERIOR LATERAL MALAR CHEEK
LOCATION DETAILED: RIGHT LATERAL CANTHUS
LOCATION DETAILED: PHILTRUM

## 2024-06-20 ASSESSMENT — LOCATION SIMPLE DESCRIPTION DERM
LOCATION SIMPLE: RIGHT EYELID
LOCATION SIMPLE: RIGHT FOREHEAD
LOCATION SIMPLE: LEFT CHEEK
LOCATION SIMPLE: GLABELLA
LOCATION SIMPLE: UPPER LIP
LOCATION SIMPLE: RIGHT CHEEK

## 2024-06-20 NOTE — PROCEDURE: BOTOX
Lateral Platysmal Bands Units: 0
Detail Level: Detailed
Show Glabellar Units: Yes
Show Mentalis Units: No
Forehead Units: 10
Show Inventory Tab: Show
Periorbital Skin Units: 6
Consent: Written consent obtained. Risks include but not limited to lid/brow ptosis, bruising, swelling, diplopia, temporary effect, incomplete chemical denervation.
Incrementing Botox Units: By 0.5 Units
Dilution (U/0.1 Cc): 4
Post-Care Instructions: Patient instructed to not lie down for 4 hours and limit physical activity for 24 hours.

## 2024-07-29 ENCOUNTER — APPOINTMENT (OUTPATIENT)
Age: 44
Setting detail: DERMATOLOGY
End: 2024-07-30

## 2024-07-29 DIAGNOSIS — Z41.9 ENCOUNTER FOR PROCEDURE FOR PURPOSES OTHER THAN REMEDYING HEALTH STATE, UNSPECIFIED: ICD-10-CM

## 2024-07-29 PROCEDURE — OTHER MIPS QUALITY: OTHER

## 2024-07-29 PROCEDURE — OTHER BOTOX: OTHER

## 2024-07-29 ASSESSMENT — LOCATION SIMPLE DESCRIPTION DERM
LOCATION SIMPLE: RIGHT EYELID
LOCATION SIMPLE: RIGHT FOREHEAD
LOCATION SIMPLE: GLABELLA
LOCATION SIMPLE: LEFT CHEEK

## 2024-07-29 ASSESSMENT — LOCATION ZONE DERM
LOCATION ZONE: FACE
LOCATION ZONE: EYELID

## 2024-07-29 ASSESSMENT — LOCATION DETAILED DESCRIPTION DERM
LOCATION DETAILED: RIGHT MEDIAL FOREHEAD
LOCATION DETAILED: GLABELLA
LOCATION DETAILED: RIGHT LATERAL CANTHUS
LOCATION DETAILED: LEFT SUPERIOR LATERAL MALAR CHEEK

## 2024-07-29 NOTE — PROCEDURE: BOTOX
Lateral Platysmal Bands Units: 0
Detail Level: Detailed
Show Glabellar Units: Yes
Show Mentalis Units: No
Glabellar Complex Units: 6
Show Inventory Tab: Show
Consent: Written consent obtained. Risks include but not limited to lid/brow ptosis, bruising, swelling, diplopia, temporary effect, incomplete chemical denervation.
Incrementing Botox Units: By 0.5 Units
Dilution (U/0.1 Cc): 4
Post-Care Instructions: Patient instructed to not lie down for 4 hours and limit physical activity for 24 hours.

## 2024-10-09 ENCOUNTER — APPOINTMENT (OUTPATIENT)
Age: 44
Setting detail: DERMATOLOGY
End: 2024-10-10

## 2024-10-09 DIAGNOSIS — Z41.9 ENCOUNTER FOR PROCEDURE FOR PURPOSES OTHER THAN REMEDYING HEALTH STATE, UNSPECIFIED: ICD-10-CM

## 2024-10-09 PROCEDURE — OTHER OTHER: OTHER

## 2024-10-09 PROCEDURE — OTHER MIPS QUALITY: OTHER

## 2024-10-09 PROCEDURE — OTHER BOTOX: OTHER

## 2024-10-09 ASSESSMENT — LOCATION DETAILED DESCRIPTION DERM
LOCATION DETAILED: LEFT NASAL SIDEWALL
LOCATION DETAILED: LEFT INFERIOR FOREHEAD
LOCATION DETAILED: LEFT MID TEMPLE
LOCATION DETAILED: LEFT CHIN
LOCATION DETAILED: RIGHT LATERAL EYEBROW
LOCATION DETAILED: RIGHT SUPERIOR CENTRAL MALAR CHEEK
LOCATION DETAILED: RIGHT MEDIAL CANTHUS
LOCATION DETAILED: RIGHT FOREHEAD
LOCATION DETAILED: LEFT SUPERIOR LATERAL MALAR CHEEK
LOCATION DETAILED: GLABELLA
LOCATION DETAILED: LEFT MEDIAL FOREHEAD
LOCATION DETAILED: LEFT SUPERIOR FOREHEAD
LOCATION DETAILED: RIGHT INFERIOR MEDIAL FOREHEAD

## 2024-10-09 ASSESSMENT — LOCATION SIMPLE DESCRIPTION DERM
LOCATION SIMPLE: LEFT TEMPLE
LOCATION SIMPLE: LEFT CHEEK
LOCATION SIMPLE: RIGHT CHEEK
LOCATION SIMPLE: LEFT FOREHEAD
LOCATION SIMPLE: GLABELLA
LOCATION SIMPLE: RIGHT EYELID
LOCATION SIMPLE: RIGHT EYEBROW
LOCATION SIMPLE: LEFT NOSE
LOCATION SIMPLE: CHIN
LOCATION SIMPLE: RIGHT FOREHEAD

## 2024-10-09 ASSESSMENT — LOCATION ZONE DERM
LOCATION ZONE: EYELID
LOCATION ZONE: NOSE
LOCATION ZONE: FACE

## 2024-10-09 NOTE — PROCEDURE: BOTOX
[facility] Units: 0
Show Additional Area 1: Yes
Additional Area 1 Location: chin
Show Right And Left Pupillary Line Units: No
Incrementing Botox Units: By 0.5 Units
Additional Area 1 Units: 6
Dilution (U/0.1 Cc): 4
Detail Level: Detailed
Forehead Units: 10
Show Inventory Tab: Show
Consent: Written consent obtained. Risks include but not limited to lid/brow ptosis, bruising, swelling, diplopia, temporary effect, incomplete chemical denervation.
Post-Care Instructions: Patient instructed to not lie down for 4 hours and limit physical activity for 24 hours.

## 2024-10-09 NOTE — PROCEDURE: OTHER
Note Text (......Xxx Chief Complaint.): This diagnosis correlates with the
Detail Level: Zone
Render Risk Assessment In Note?: no
Other (Free Text): 20% off Botox

## 2024-10-28 ENCOUNTER — APPOINTMENT (OUTPATIENT)
Age: 44
Setting detail: DERMATOLOGY
End: 2024-10-29

## 2024-10-28 DIAGNOSIS — Z41.9 ENCOUNTER FOR PROCEDURE FOR PURPOSES OTHER THAN REMEDYING HEALTH STATE, UNSPECIFIED: ICD-10-CM

## 2024-10-28 PROCEDURE — OTHER BOTOX: OTHER

## 2024-10-28 PROCEDURE — OTHER MIPS QUALITY: OTHER

## 2024-10-28 NOTE — PROCEDURE: BOTOX
Lcl Root Units: 0
Show Nasal Units: Yes
Show Inventory Tab: Show
Show Ucl Units: No
Incrementing Botox Units: By 0.5 Units
Consent: Written consent obtained. Risks include but not limited to lid/brow ptosis, bruising, swelling, diplopia, temporary effect, incomplete chemical denervation.
Post-Care Instructions: Patient instructed to not lie down for 4 hours and limit physical activity for 24 hours.
Detail Level: Detailed
Dilution (U/0.1 Cc): 4
Forehead Units: 10

## 2025-05-08 ENCOUNTER — APPOINTMENT (OUTPATIENT)
Age: 45
Setting detail: DERMATOLOGY
End: 2025-05-08

## 2025-05-08 DIAGNOSIS — Z41.9 ENCOUNTER FOR PROCEDURE FOR PURPOSES OTHER THAN REMEDYING HEALTH STATE, UNSPECIFIED: ICD-10-CM

## 2025-05-08 PROCEDURE — OTHER MIPS QUALITY: OTHER

## 2025-05-08 PROCEDURE — OTHER BOTOX: OTHER

## 2025-05-15 ENCOUNTER — APPOINTMENT (OUTPATIENT)
Age: 45
Setting detail: DERMATOLOGY
End: 2025-05-15

## 2025-05-15 DIAGNOSIS — Z41.9 ENCOUNTER FOR PROCEDURE FOR PURPOSES OTHER THAN REMEDYING HEALTH STATE, UNSPECIFIED: ICD-10-CM

## 2025-05-15 PROCEDURE — OTHER OTHER (COSMETIC): OTHER

## 2025-05-15 PROCEDURE — OTHER MIPS QUALITY: OTHER
